# Patient Record
Sex: FEMALE | Race: WHITE
[De-identification: names, ages, dates, MRNs, and addresses within clinical notes are randomized per-mention and may not be internally consistent; named-entity substitution may affect disease eponyms.]

---

## 2017-07-19 ENCOUNTER — HOSPITAL ENCOUNTER (OUTPATIENT)
Dept: HOSPITAL 61 - SLPLAB | Age: 57
Discharge: HOME | End: 2017-07-19
Attending: INTERNAL MEDICINE
Payer: COMMERCIAL

## 2017-07-19 DIAGNOSIS — R53.83: Primary | ICD-10-CM

## 2017-07-19 PROCEDURE — 95810 POLYSOM 6/> YRS 4/> PARAM: CPT

## 2017-07-21 NOTE — SLEEP
DATE OF STUDY:  07/19/2017



ATTENDING PHYSICIAN:  Dr. Higinio Coates.



The patient is a 56-year-old, who weighs 219 pounds with a BMI of 35.  The

patient's Canby score was only 4.  Sleep study was performed by [facility].



During the night study, the patient spent 411 minutes in bed and slept for 376

minutes with a sleep efficiency of 91%.  Sleep latency was 16 minutes with a REM

latency of 246 minutes, which was prolonged.  Overall, sleep architecture showed

normal stage I and stage II sleep, increased slow wave and normal REM sleep.



During the night study, the patient had only 2 obstructive apneas and 6 central

apneas.  There were no mixed apneas and no hypopneas.  The patient's

apnea-hypopnea index was only 1 per hour with a supine index of 0 per hour and a

REM index of 6 per hour.



During the night study, the patient's review of oximetry study data reveal no

significant desaturations of less than 90%.  The lowest saturation was 89% and a

mean of 95%.



PLMs were seen at the index of 28 per hour and 7 per hour caused EEG arousals.



EKG monitoring revealed normal sinus rhythm.  No sustained arrhythmias were

observed.  Average heart rate was 70 beats per minute.





Due to low AHI, the patient did not met the split night criteria for CPAP

initiation.



IMPRESSION:

1.  No clinically significant sleep disorder breathing.

2.  Moderate PLMs.

3.  No clinically significant nocturnal hypoxia.



RECOMMENDATIONS:

1.  The patient did not met the criteria for CPAP initiation.

2.  Weight loss is advised.

3.  The patient should be further evaluated for any symptoms of restless legs

during the day.

 



______________________________

DARIUS RIZZO MD



DR:  LYNETTE/jerel  JOB#:  [id number] / [id number]

DD:  07/21/2017 14:54  DT:  07/21/2017 19:37



HIGINIO Garcia MD

## 2018-04-19 ENCOUNTER — HOSPITAL ENCOUNTER (OUTPATIENT)
Dept: HOSPITAL 61 - CT | Age: 58
Discharge: HOME | End: 2018-04-19
Attending: PHYSICIAN ASSISTANT
Payer: COMMERCIAL

## 2018-04-19 ENCOUNTER — HOSPITAL ENCOUNTER (OUTPATIENT)
Dept: HOSPITAL 61 - OPS | Age: 58
Discharge: HOME | End: 2018-04-19
Attending: PHYSICIAN ASSISTANT
Payer: COMMERCIAL

## 2018-04-19 DIAGNOSIS — F41.9: ICD-10-CM

## 2018-04-19 DIAGNOSIS — J44.9: ICD-10-CM

## 2018-04-19 DIAGNOSIS — K21.9: ICD-10-CM

## 2018-04-19 DIAGNOSIS — I73.89: Primary | ICD-10-CM

## 2018-04-19 DIAGNOSIS — E86.0: Primary | ICD-10-CM

## 2018-04-19 DIAGNOSIS — G31.9: ICD-10-CM

## 2018-04-19 DIAGNOSIS — Z87.01: ICD-10-CM

## 2018-04-19 DIAGNOSIS — Z87.891: ICD-10-CM

## 2018-04-19 DIAGNOSIS — I10: ICD-10-CM

## 2018-04-19 DIAGNOSIS — F32.9: ICD-10-CM

## 2018-04-19 DIAGNOSIS — E86.0: ICD-10-CM

## 2018-04-19 PROCEDURE — 96361 HYDRATE IV INFUSION ADD-ON: CPT

## 2018-04-19 PROCEDURE — 96360 HYDRATION IV INFUSION INIT: CPT

## 2018-04-19 PROCEDURE — 70450 CT HEAD/BRAIN W/O DYE: CPT

## 2018-04-19 RX ADMIN — BACITRACIN 1 MLS/HR: 5000 INJECTION, POWDER, FOR SOLUTION INTRAMUSCULAR at 13:35

## 2018-04-19 RX ADMIN — BACITRACIN 1 MLS/HR: 5000 INJECTION, POWDER, FOR SOLUTION INTRAMUSCULAR at 14:20

## 2018-05-24 ENCOUNTER — HOSPITAL ENCOUNTER (OUTPATIENT)
Dept: HOSPITAL 63 - US | Age: 58
Discharge: HOME | End: 2018-05-24
Attending: INTERNAL MEDICINE
Payer: COMMERCIAL

## 2018-05-24 DIAGNOSIS — R16.0: ICD-10-CM

## 2018-05-24 DIAGNOSIS — K83.0: Primary | ICD-10-CM

## 2018-05-24 LAB
ALBUMIN SERPL-MCNC: 3.1 G/DL (ref 3.4–5)
ALBUMIN/GLOB SERPL: 0.6 {RATIO} (ref 1–1.7)
ALP SERPL-CCNC: 343 U/L (ref 46–116)
ALT SERPL-CCNC: 45 U/L (ref 14–59)
ANION GAP SERPL CALC-SCNC: 7 MMOL/L (ref 6–14)
AST SERPL-CCNC: 31 U/L (ref 15–37)
BILIRUB SERPL-MCNC: 0.3 MG/DL (ref 0.2–1)
BUN/CREAT SERPL: 12 (ref 6–20)
CA-I SERPL ISE-MCNC: 13 MG/DL (ref 7–20)
CALCIUM SERPL-MCNC: 9 MG/DL (ref 8.5–10.1)
CHLORIDE SERPL-SCNC: 102 MMOL/L (ref 98–107)
CO2 SERPL-SCNC: 31 MMOL/L (ref 21–32)
CREAT SERPL-MCNC: 1.1 MG/DL (ref 0.6–1)
GFR SERPLBLD BASED ON 1.73 SQ M-ARVRAT: 50.8 ML/MIN
GLOBULIN SER-MCNC: 4.9 G/DL (ref 2.2–3.8)
GLUCOSE SERPL-MCNC: 88 MG/DL (ref 70–99)
POTASSIUM SERPL-SCNC: 4.2 MMOL/L (ref 3.5–5.1)
PROT SERPL-MCNC: 8 G/DL (ref 6.4–8.2)
SODIUM SERPL-SCNC: 140 MMOL/L (ref 136–145)

## 2018-05-24 PROCEDURE — 80053 COMPREHEN METABOLIC PANEL: CPT

## 2018-05-24 PROCEDURE — 36415 COLL VENOUS BLD VENIPUNCTURE: CPT

## 2018-05-24 PROCEDURE — 76700 US EXAM ABDOM COMPLETE: CPT

## 2018-05-24 NOTE — RAD
Complete abdominal ultrasound

 

History: Primary biliary cholangitis.

 

Comparison: None.

 

Procedure: Transabdominal ultrasound images are obtained.

 

Findings:

 

Visualized pancreas is unremarkable.  

 

Liver is normal in echogenicity.  No focal hepatic masses are identified.

Right lobe of the liver measures borderline enlarged at 16.8 cm.  

 

Gallbladder has an unremarkable appearance.  Common bile duct measures 

borderline dilated at 6-7 mm in diameter.

 

Spleen is homogeneous and measures 10.0 cm in length.  

 

Right kidney is normal in size and configuration without hydronephrosis. 

Left kidney is normal in size and configuration without hydronephrosis.

 

Visualized portions of the aorta and IVC have normal caliber.

 

Impression: 

1.  Borderline hepatomegaly.  

2.  Common bile duct is borderline dilated at 6-7 mm.  Cause is not 

identified.  No gallstones are seen.

 

 

 

 

Electronically signed by: Federico Sharpe MD (5/24/2018 8:59 AM) [facility]

## 2018-12-03 ENCOUNTER — HOSPITAL ENCOUNTER (OUTPATIENT)
Dept: HOSPITAL 63 - US | Age: 58
Discharge: HOME | End: 2018-12-03
Payer: COMMERCIAL

## 2018-12-03 DIAGNOSIS — K74.5: Primary | ICD-10-CM

## 2018-12-03 PROCEDURE — 76700 US EXAM ABDOM COMPLETE: CPT

## 2018-12-03 NOTE — RAD
Abdominal ultrasound, 12/3/2018:

 

HISTORY: Biliary cirrhosis

 

The gallbladder is within normal limits in size. There is no sonographic 

evidence of cholelithiasis. The gallbladder walls are not thickened. The 

common hepatic duct measures 6-7 mm which is at the upper limits of 

normal. This finding is unchanged since 5/24/2018. No intrahepatic bile 

duct dilatation is seen. There is no evidence of a hepatic mass. The 

visualized portions of the pancreatic body are unremarkable. Other 

portions of the pancreas were obscured by overlying bowel. The visualized 

portions of the abdominal aorta and inferior vena cava show no 

abnormality. The spleen is of normal size. No renal abnormality is 

detected. No free fluid is evident in the abdomen.

 

IMPRESSION:

1. Unchanged borderline prominence of the common hepatic duct.

2. No new abnormality is detected.

 

Electronically signed by: Rick Moritz, MD (12/3/2018 9:26 AM) [facility]

## 2019-06-04 ENCOUNTER — HOSPITAL ENCOUNTER (OUTPATIENT)
Dept: HOSPITAL 63 - US | Age: 59
Discharge: HOME | End: 2019-06-04
Attending: INTERNAL MEDICINE
Payer: COMMERCIAL

## 2019-06-04 DIAGNOSIS — K74.5: Primary | ICD-10-CM

## 2019-06-04 PROCEDURE — 76700 US EXAM ABDOM COMPLETE: CPT

## 2019-06-04 NOTE — RAD
Abdominal ultrasound compared to similar study dated December 3, 2018 for 

autoimmune disease of the liver, biliary cirrhosis.

 

TECHNIQUE: Real-time gray scale and color Doppler evaluation of the 

abdominal organs is performed. The liver is normal in size, contour, and 

echogenicity. No intra or extra hepatic biliary ductal dilatation. Common 

bile duct measures 6.5 mm, and is unchanged from the prior exam. 

Gallbladder is normal in appearance. There is hepatopedal flow within a 

patent portal vein. The right kidney measures 9.8 x 5.7 x 4.2 cm in the 

left measures 11.2 x 5.7 x 4.8 cm. No hydronephrosis or focal parenchymal 

abnormality. Normal color flow to both kidneys. Visualized portions of 

pancreas are normal. The spleen is normal in size and contour with no 

focal parenchymal abnormality. The aorta is nonaneurysmal. The IVC is 

patent.

 

IMPRESSION:

1. No sonographically discernible abdominal abnormality. 

 

Electronically signed by: Christopher Antonio MD (6/4/2019 2:35 PM) [id number]